# Patient Record
Sex: FEMALE | Race: ASIAN | NOT HISPANIC OR LATINO
[De-identification: names, ages, dates, MRNs, and addresses within clinical notes are randomized per-mention and may not be internally consistent; named-entity substitution may affect disease eponyms.]

---

## 2018-07-23 ENCOUNTER — APPOINTMENT (OUTPATIENT)
Dept: ORTHOPEDIC SURGERY | Facility: CLINIC | Age: 58
End: 2018-07-23
Payer: COMMERCIAL

## 2018-07-23 DIAGNOSIS — M25.552 PAIN IN LEFT HIP: ICD-10-CM

## 2018-07-23 PROBLEM — Z00.00 ENCOUNTER FOR PREVENTIVE HEALTH EXAMINATION: Status: ACTIVE | Noted: 2018-07-23

## 2018-07-23 PROCEDURE — 73502 X-RAY EXAM HIP UNI 2-3 VIEWS: CPT | Mod: LT

## 2018-07-23 PROCEDURE — 99202 OFFICE O/P NEW SF 15 MIN: CPT

## 2018-08-13 ENCOUNTER — RX RENEWAL (OUTPATIENT)
Age: 58
End: 2018-08-13

## 2018-08-13 RX ORDER — CELECOXIB 200 MG/1
200 CAPSULE ORAL DAILY
Qty: 30 | Refills: 0 | Status: ACTIVE | COMMUNITY
Start: 2018-08-13 | End: 1900-01-01

## 2018-09-10 ENCOUNTER — RX RENEWAL (OUTPATIENT)
Age: 58
End: 2018-09-10

## 2018-09-28 ENCOUNTER — OTHER (OUTPATIENT)
Age: 58
End: 2018-09-28

## 2018-10-15 ENCOUNTER — RX RENEWAL (OUTPATIENT)
Age: 58
End: 2018-10-15

## 2018-11-19 ENCOUNTER — RX RENEWAL (OUTPATIENT)
Age: 58
End: 2018-11-19

## 2018-11-19 RX ORDER — CELECOXIB 200 MG/1
200 CAPSULE ORAL DAILY
Qty: 30 | Refills: 0 | Status: ACTIVE | COMMUNITY
Start: 2018-08-13 | End: 1900-01-01

## 2018-12-01 ENCOUNTER — OTHER (OUTPATIENT)
Age: 58
End: 2018-12-01

## 2018-12-28 ENCOUNTER — APPOINTMENT (OUTPATIENT)
Dept: ORTHOPEDIC SURGERY | Facility: CLINIC | Age: 58
End: 2018-12-28
Payer: MEDICAID

## 2018-12-28 VITALS — BODY MASS INDEX: 25.69 KG/M2 | HEIGHT: 63 IN | WEIGHT: 145 LBS

## 2018-12-28 PROCEDURE — 99214 OFFICE O/P EST MOD 30 MIN: CPT

## 2019-02-07 ENCOUNTER — OUTPATIENT (OUTPATIENT)
Dept: OUTPATIENT SERVICES | Facility: HOSPITAL | Age: 59
LOS: 1 days | End: 2019-02-07
Payer: COMMERCIAL

## 2019-02-07 VITALS
HEART RATE: 66 BPM | DIASTOLIC BLOOD PRESSURE: 80 MMHG | SYSTOLIC BLOOD PRESSURE: 115 MMHG | HEIGHT: 63 IN | OXYGEN SATURATION: 96 % | WEIGHT: 145.06 LBS | TEMPERATURE: 96 F

## 2019-02-07 DIAGNOSIS — M75.111 INCOMPLETE ROTATOR CUFF TEAR OR RUPTURE OF RIGHT SHOULDER, NOT SPECIFIED AS TRAUMATIC: ICD-10-CM

## 2019-02-07 DIAGNOSIS — M67.911 UNSPECIFIED DISORDER OF SYNOVIUM AND TENDON, RIGHT SHOULDER: ICD-10-CM

## 2019-02-07 DIAGNOSIS — Z01.818 ENCOUNTER FOR OTHER PREPROCEDURAL EXAMINATION: ICD-10-CM

## 2019-02-07 LAB
HCT VFR BLD CALC: 43.2 % — SIGNIFICANT CHANGE UP (ref 34.5–45)
HGB BLD-MCNC: 14 G/DL — SIGNIFICANT CHANGE UP (ref 11.5–15.5)
MCHC RBC-ENTMCNC: 29.2 PG — SIGNIFICANT CHANGE UP (ref 27–34)
MCHC RBC-ENTMCNC: 32.4 GM/DL — SIGNIFICANT CHANGE UP (ref 32–36)
MCV RBC AUTO: 90 FL — SIGNIFICANT CHANGE UP (ref 80–100)
NRBC # BLD: 0 /100 WBCS — SIGNIFICANT CHANGE UP (ref 0–0)
PLATELET # BLD AUTO: 434 K/UL — HIGH (ref 150–400)
RBC # BLD: 4.8 M/UL — SIGNIFICANT CHANGE UP (ref 3.8–5.2)
RBC # FLD: 11.9 % — SIGNIFICANT CHANGE UP (ref 10.3–14.5)
WBC # BLD: 7.66 K/UL — SIGNIFICANT CHANGE UP (ref 3.8–10.5)
WBC # FLD AUTO: 7.66 K/UL — SIGNIFICANT CHANGE UP (ref 3.8–10.5)

## 2019-02-07 PROCEDURE — 93010 ELECTROCARDIOGRAM REPORT: CPT

## 2019-02-07 PROCEDURE — 93005 ELECTROCARDIOGRAM TRACING: CPT

## 2019-02-07 PROCEDURE — 36415 COLL VENOUS BLD VENIPUNCTURE: CPT

## 2019-02-07 PROCEDURE — 85027 COMPLETE CBC AUTOMATED: CPT

## 2019-02-07 PROCEDURE — G0463: CPT

## 2019-02-07 NOTE — H&P PST ADULT - HISTORY OF PRESENT ILLNESS
59 y/o female from home  accompanied with her daughter with PMH of osteoporosis and HDL came in PST for chief complaint  of chronic pain for more than 6 months to her right shoulder. Denied any injury/trauma to shoulder Pt stated that her pain is constant, moderate, (8/10) radiating down to her finger with worsen with activities such as lifting or doing regular home task, she gets some relief with resting and tylenol po PRN a. Pt is now here for PST for Arthroscopy because of failed pharmacological /conservative interventions

## 2019-02-07 NOTE — H&P PST ADULT - PMH
History of migraine headaches    HLD (hyperlipidemia)    Osteoporosis    Rotator cuff disorder, right

## 2019-02-26 ENCOUNTER — TRANSCRIPTION ENCOUNTER (OUTPATIENT)
Age: 59
End: 2019-02-26

## 2019-02-27 ENCOUNTER — RESULT REVIEW (OUTPATIENT)
Age: 59
End: 2019-02-27

## 2019-02-27 ENCOUNTER — APPOINTMENT (OUTPATIENT)
Dept: ORTHOPEDIC SURGERY | Facility: HOSPITAL | Age: 59
End: 2019-02-27

## 2019-02-27 RX ORDER — SIMVASTATIN 20 MG/1
1 TABLET, FILM COATED ORAL
Qty: 0 | Refills: 0 | COMMUNITY

## 2019-02-27 RX ORDER — ACETAMINOPHEN 500 MG
2 TABLET ORAL
Qty: 0 | Refills: 0 | COMMUNITY

## 2019-02-27 RX ORDER — CELECOXIB 200 MG/1
1 CAPSULE ORAL
Qty: 0 | Refills: 0 | COMMUNITY

## 2019-02-27 RX ORDER — ALENDRONATE SODIUM 70 MG/1
1 TABLET ORAL
Qty: 0 | Refills: 0 | COMMUNITY

## 2019-03-03 ENCOUNTER — EMERGENCY (EMERGENCY)
Facility: HOSPITAL | Age: 59
LOS: 1 days | Discharge: HOME | End: 2019-03-03
Attending: EMERGENCY MEDICINE | Admitting: EMERGENCY MEDICINE
Payer: COMMERCIAL

## 2019-03-03 VITALS
SYSTOLIC BLOOD PRESSURE: 139 MMHG | RESPIRATION RATE: 16 BRPM | HEIGHT: 63 IN | TEMPERATURE: 98 F | WEIGHT: 147.93 LBS | OXYGEN SATURATION: 99 % | DIASTOLIC BLOOD PRESSURE: 85 MMHG | HEART RATE: 117 BPM

## 2019-03-03 VITALS — DIASTOLIC BLOOD PRESSURE: 85 MMHG | TEMPERATURE: 97 F | HEART RATE: 120 BPM | SYSTOLIC BLOOD PRESSURE: 142 MMHG

## 2019-03-03 PROCEDURE — 99283 EMERGENCY DEPT VISIT LOW MDM: CPT

## 2019-03-03 RX ADMIN — Medication 1 ENEMA: at 12:48

## 2019-03-03 NOTE — ED PROVIDER NOTE - CLINICAL SUMMARY MEDICAL DECISION MAKING FREE TEXT BOX
pt with constipation, rectal pressure no bm in 5 days s/p shoulder surg and taking percocets - disimpact/enema

## 2019-03-03 NOTE — ED ADULT NURSE NOTE - OBJECTIVE STATEMENT
pt states she has not had a bowel movement since Wednesday. pt had shoulder surgery on Thursday, and has been taking percocet for pain. pt with complaints of rectal pain and burning.

## 2019-03-03 NOTE — ED ADULT NURSE NOTE - CHIEF COMPLAINT QUOTE
pt s/p arthroscopic right shoulder surgery this past wednesday at [facility], c/o of constipation x 5 days. on percocet

## 2019-03-03 NOTE — ED ADULT TRIAGE NOTE - CHIEF COMPLAINT QUOTE
pt s/p arthroscopic right shoulder surgery this past wednesday at [facility], c/o of constipation x 5 days. on percocet

## 2019-03-03 NOTE — ED PROVIDER NOTE - OBJECTIVE STATEMENT
pt s/p right shoulder surg 2/27 on percocet for postop pain bib daughter for constipation with no BM since surgery. + rectal pain and pressure. no fevers, chills, d/n/v, abd pain.  pmd - amin

## 2019-03-04 PROBLEM — M67.911 UNSPECIFIED DISORDER OF SYNOVIUM AND TENDON, RIGHT SHOULDER: Chronic | Status: ACTIVE | Noted: 2019-02-07

## 2019-03-04 PROBLEM — M81.0 AGE-RELATED OSTEOPOROSIS WITHOUT CURRENT PATHOLOGICAL FRACTURE: Chronic | Status: ACTIVE | Noted: 2019-02-07

## 2019-03-04 PROBLEM — Z86.69 PERSONAL HISTORY OF OTHER DISEASES OF THE NERVOUS SYSTEM AND SENSE ORGANS: Chronic | Status: ACTIVE | Noted: 2019-02-07

## 2019-03-04 PROBLEM — E78.5 HYPERLIPIDEMIA, UNSPECIFIED: Chronic | Status: ACTIVE | Noted: 2019-02-07

## 2019-03-06 PROBLEM — E78.5 HYPERLIPIDEMIA, UNSPECIFIED: Chronic | Status: ACTIVE | Noted: 2019-03-03

## 2019-03-06 PROBLEM — M81.0 AGE-RELATED OSTEOPOROSIS WITHOUT CURRENT PATHOLOGICAL FRACTURE: Chronic | Status: ACTIVE | Noted: 2019-03-03

## 2019-03-08 ENCOUNTER — APPOINTMENT (OUTPATIENT)
Dept: ORTHOPEDIC SURGERY | Facility: CLINIC | Age: 59
End: 2019-03-08
Payer: MEDICAID

## 2019-03-08 VITALS — HEIGHT: 63 IN | WEIGHT: 145 LBS | BODY MASS INDEX: 25.69 KG/M2

## 2019-03-08 PROCEDURE — 99024 POSTOP FOLLOW-UP VISIT: CPT

## 2019-03-08 RX ORDER — OXYCODONE AND ACETAMINOPHEN 5; 325 MG/1; MG/1
5-325 TABLET ORAL
Qty: 20 | Refills: 0 | Status: ACTIVE | COMMUNITY
Start: 2019-03-08 | End: 1900-01-01

## 2019-03-08 NOTE — ADDENDUM
[FreeTextEntry1] : I, Bernardo Mendez, acted solely as a scribe for Dr. Gavin Hoover on this date 03/08/2019.\par \par \par \par

## 2019-03-08 NOTE — HISTORY OF PRESENT ILLNESS
[Doing Well] : is doing well [Excellent Pain Control] : has excellent pain control [No Sign of Infection] : is showing no signs of infection [___ Weeks Post Op] : [unfilled] weeks post op [Clean/Dry/Intact] : clean, dry and intact [Erythema] : erythematous [Swelling] : swollen [Healed] : not healed [Discharge] : absent of discharge [Dehiscence] : not dehisced [de-identified] : s/p right shoulder arthroscopic RCR DOS: 2/27/2019 [de-identified] : 58 year old female  presents for a post operative evaluation s/p right shoulder arthroscopic RCR DOS: 2/27/2019. Overall she is progressing well after her procedure. She is currently using a sling today. Overall her pain is well controlled. She is taking about one Oxycontin per day recently, but was taking about 2-3 per day in the days following her surgery. She notes some pain when extending her right arm. She denies fever, chills, nausea, or vomiting  [de-identified] : Right Upper Extremity\par o Shoulder : \par ¦ Inspection/Palpation : incision is well-healing. \par o Muscle Bulk : no atrophy\par o Sensation : sensation intact to light touch\par o Skin : no skin rash or discoloration\par o Vascular Exam : no edema or cyanosis, radial and ulnar pulses normal  [de-identified] : 58 year old female s/p right shoulder arthroscopic RCR DOS: 2/27/2019\par  [de-identified] : Arthroscopy images reviewed in great detail with patient.\par Sutures were removed and Steri-Strips were placed. she  was instructed to allow the Steri-Strips to fall off on their own.\par She is to continue use of the sling.\par Activity modifications and restrictions were discussed. \par I provided a refill for Oxycontin.\par FU 4 weeks.

## 2019-03-08 NOTE — END OF VISIT
[FreeTextEntry3] : All medical record entries made by the Rachelibeddie were at my, Dr. Gavin Hoover, direction and personally dictated by me on 03/08/2019 . I have reviewed the chart and agree that the record accurately reflects my personal performance of the history, physical exam, assessment and plan. I have also personally directed, reviewed, and agreed with the chart.

## 2019-04-05 ENCOUNTER — APPOINTMENT (OUTPATIENT)
Dept: ORTHOPEDIC SURGERY | Facility: CLINIC | Age: 59
End: 2019-04-05
Payer: MEDICAID

## 2019-04-05 PROCEDURE — 99024 POSTOP FOLLOW-UP VISIT: CPT

## 2019-04-05 NOTE — END OF VISIT
[FreeTextEntry3] : All medical record entries made by the Rachelibeddie were at my, Dr. Gavin Hoover, direction and personally dictated by me on 04/05/2019. I have reviewed the chart and agree that the record accurately reflects my personal performance of the history, physical exam, assessment and plan. I have also personally directed, reviewed, and agreed with the chart.

## 2019-04-05 NOTE — HISTORY OF PRESENT ILLNESS
[___ Weeks Post Op] : [unfilled] weeks post op [de-identified] : s/p right shoulder arthroscopic RCR on 2/27/2019. [de-identified] : 58 year old female  presents for a post operative evaluation s/p right shoulder arthroscopic RCR on 2/27/2019. She is wearing a sling and says that she has been doing well and that she has been experiencing minimal pain about her shoulder. She has only had minimal discomfort with sleeping at night. She has no signs of infections and denies fever, chills, nausea, or vomiting. She has no other complaints at this time.  [de-identified] : Right Upper Extremity\par o Shoulder :\par ¦ Inspection/Palpation : improved tenderness about the shoulder, no swelling, no deformity. Arthroscopic incisions well healed, clean, dry, and intact with no discharge or dehiscence  \par ¦ Range of Motion : PASSIVE FORWARD ELEVATION: Measured at 70 degrees, ACTIVE EXTERNAL ROTATION: Measured at 20 degrees, ACTIVE INTERNAL ROTATION: Measured at  greater trochanter\par o Upper Arm : no tenderness, no swelling, no deformities\par o Muscle Bulk : no atrophy \par o Sensation : sensation intact to light touch \par o Skin : no skin rash, no discoloration \par o Vascular Exam : no edema, no cyanosis, radial and ulnar pulses normal  [de-identified] : She is to continue use of the sling for the next 1-2 weeks. \par \par A prescription for Physical Therapy was provided. \par \par Activity modifications and restrictions were discussed. \par \par FU 6 weeks.

## 2019-04-05 NOTE — ADDENDUM
[FreeTextEntry1] : I, Emmy Hall, acted solely as a scribe for Dr. Gavin Hoover on this date 04/05/2019.

## 2019-05-10 ENCOUNTER — APPOINTMENT (OUTPATIENT)
Dept: ORTHOPEDIC SURGERY | Facility: CLINIC | Age: 59
End: 2019-05-10
Payer: MEDICAID

## 2019-05-10 VITALS — BODY MASS INDEX: 25.69 KG/M2 | WEIGHT: 145 LBS | HEIGHT: 63 IN

## 2019-05-10 PROCEDURE — 99024 POSTOP FOLLOW-UP VISIT: CPT

## 2019-05-10 NOTE — END OF VISIT
[FreeTextEntry3] : All medical record entries made by the Rachelibe were at my, Dr. Gavin Hoover, direction and personally dictated by me on 05/10/2019. I have reviewed the chart and agree that the record accurately reflects my personal performance of the history, physical exam, assessment and plan. I have also personally directed, reviewed, and agreed with the chart.

## 2019-05-10 NOTE — HISTORY OF PRESENT ILLNESS
[___ Weeks Post Op] : [unfilled] weeks post op [de-identified] : Right Upper Extremity\par o Shoulder :\par ¦ Inspection/Palpation : tenderness about the shoulder, no swelling, no deformity. Arthroscopic incisions well healed, clean, dry, and intact with no discharge or dehiscence \par ¦ Range of Motion :  PASSIVE FORWARD ELEVATION: Measured at 95 degrees, ACTIVE FORWARD ELEVATION: Measured at 60 degrees, ACTIVE EXTERNAL ROTATION: Measured at 15 degrees, ACTIVE INTERNAL ROTATION: Measured at L5 \par ¦ Strength : external rotation 3/5, internal rotation 5/5, supraspinatus 3/5 \par ¦ Stability : no joint instability on provocative testing\par o Upper Arm : no tenderness, no swelling, no deformities\par o Muscle Bulk : no atrophy \par o Sensation : sensation intact to light touch \par o Skin : no skin rash, no discoloration \par o Vascular Exam : no edema, no cyanosis, radial and ulnar pulses normal  [de-identified] : 58 year old female  presents for a post operative evaluation s/p right shoulder arthroscopic RCR on 2/27/2019. The patient is accompanied by her daughter who is contributing to her medical history. She has been doing well and says that she has been experiencing minimal pain about her shoulder. She has been attending physical therapy and notes pain about her shoulder after the sessions. She has no signs of infections and denies fever, chills, nausea, or vomiting. She has no other complaints at this time.  [de-identified] : s/p right shoulder arthroscopic RCR on 2/27/2019. [de-identified] : She is to continue with physical therapy and a home exercise program to increase her strength and ROM, a prescription for physical therapy was provided.\par \par FU 4-6 weeks.

## 2019-05-10 NOTE — ADDENDUM
[FreeTextEntry1] : I, Emmy Hall, acted solely as a scribe for Dr. Gavin Hoover on this date 05/10/2019.

## 2019-06-14 ENCOUNTER — APPOINTMENT (OUTPATIENT)
Dept: ORTHOPEDIC SURGERY | Facility: CLINIC | Age: 59
End: 2019-06-14
Payer: MEDICAID

## 2019-06-14 VITALS — WEIGHT: 145 LBS | BODY MASS INDEX: 25.69 KG/M2 | HEIGHT: 63 IN

## 2019-06-14 DIAGNOSIS — M75.121 COMPLETE ROTATOR CUFF TEAR OR RUPTURE OF RIGHT SHOULDER, NOT SPECIFIED AS TRAUMATIC: ICD-10-CM

## 2019-06-14 PROCEDURE — 99213 OFFICE O/P EST LOW 20 MIN: CPT

## 2019-06-14 NOTE — ADDENDUM
[FreeTextEntry1] : I, Emmy Hall, acted solely as a scribe for Dr. Gavin Hoover on this date 06/14/2019.

## 2019-06-14 NOTE — DISCUSSION/SUMMARY
[de-identified] : The underlying pathophysiology was reviewed in great detail with the patient as well as the various treatment options, including ice, analgesics, NSAIDs, Physical therapy, steroid injections.\par \par She is to continue with physical therapy, a prescription was provided.\par \par She may begin strengthening \par \par FU 8 weeks.

## 2019-06-14 NOTE — HISTORY OF PRESENT ILLNESS
[de-identified] : 58 year old female presents for an evaluation of right shoulder pain, s/p right shoulder arthroscopic rotator cuff repair on 2/27/2019. The patient is accompanied by her daughter who is contributing to her medical history. She has been doing well since her last visit and states that she has not been experiencing pain about her shoulder and that she is able to sleep through the night. She has been attending physical therapy and performing home exercises, though she has continued to experience weakness about her shoulder. She has no other complaints at this time.

## 2019-06-14 NOTE — PHYSICAL EXAM
[Normal LUE] : Left Upper Extremity: No scars, rashes, lesions, ulcers, skin intact [Normal Touch] : sensation intact for touch [Normal] : No swelling, no edema, normal pedal pulses and normal temperature [Poor Appearance] : well-appearing [Acute Distress] : not in acute distress [Obese] : not obese [de-identified] : Right Upper Extremity\par o Shoulder :\par ¦ Inspection/Palpation : no tenderness, no swelling, no deformity, arthroscopic scars well appearing \par ¦ Range of Motion :  PASSIVE FORWARD ELEVATION: Measured at 110 degrees, ACTIVE FORWARD ELEVATION: Measured at 75 degrees, ACTIVE EXTERNAL ROTATION: Measured at 40 degrees, ACTIVE INTERNAL ROTATION: Measured at L1\par ¦ Strength : external rotation 4/5, internal rotation 5/5, supraspinatus 3+/5 \par ¦ Stability : no joint instability on provocative testing\par ¦ Tests/Signs : Neer (-), Alvarenga (-)\par o Upper Arm : no tenderness, no swelling, no deformities\par o Muscle Bulk : no atrophy \par o Sensation : sensation intact to light touch \par o Skin : no skin rash, no discoloration \par o Vascular Exam : no edema, no cyanosis, radial and ulnar pulses normal

## 2019-06-14 NOTE — END OF VISIT
[FreeTextEntry3] : All medical record entries made by the Rachelibe were at my, Dr. Gavin Hoover, direction and personally dictated by me on 06/14/2019. I have reviewed the chart and agree that the record accurately reflects my personal performance of the history, physical exam, assessment and plan. I have also personally directed, reviewed, and agreed with the chart.

## 2019-08-09 ENCOUNTER — APPOINTMENT (OUTPATIENT)
Dept: ORTHOPEDIC SURGERY | Facility: CLINIC | Age: 59
End: 2019-08-09
Payer: MEDICAID

## 2019-08-09 DIAGNOSIS — Z98.890 OTHER SPECIFIED POSTPROCEDURAL STATES: ICD-10-CM

## 2019-08-09 PROCEDURE — 99213 OFFICE O/P EST LOW 20 MIN: CPT

## 2019-08-09 NOTE — DISCUSSION/SUMMARY
[de-identified] : The underlying pathophysiology was reviewed in great detail with the patient as well as the various treatment options, including ice, analgesics, NSAIDs, Physical therapy, steroid injections.\par \par A prescription for Physical Therapy was provided, a prescription was provided.\par \par An MRI of the right shoulder will be ordered if her strength and ROM does not improve.\par \par FU 4-6 weeks.

## 2019-08-09 NOTE — HISTORY OF PRESENT ILLNESS
[de-identified] : 58 year old female presents for an evaluation of right shoulder pain, s/p right shoulder arthroscopic rotator cuff repair on 2/27/2019. The patient is accompanied to the office by her daughter who is contributing to her medical history. She has been attending physical therapy as well as performing an at home exercise program and notes improvements in her strength and ROM, though she reports she is still having weakness and limitation in her shoulder mobility. The patient notes that she has been experiencing no pain about her right shoulder. She has no other complaints at this time.

## 2019-08-09 NOTE — END OF VISIT
[FreeTextEntry3] : All medical record entries made by the Rachelibeddie were at my, Dr. Gavin Hoover, direction and personally dictated by me on 08/09/2019. I have reviewed the chart and agree that the record accurately reflects my personal performance of the history, physical exam, assessment and plan. I have also personally directed, reviewed, and agreed with the chart.

## 2019-08-09 NOTE — ADDENDUM
[FreeTextEntry1] : I, Savanna Carpenter, acted solely as a scribe for Dr. Gavin Hoover on this date 08/09/2019.

## 2019-08-09 NOTE — PHYSICAL EXAM
[Normal LUE] : Left Upper Extremity: No scars, rashes, lesions, ulcers, skin intact [Normal Touch] : sensation intact for touch [Normal] : No swelling, no edema, normal pedal pulses and normal temperature [Poor Appearance] : well-appearing [Acute Distress] : not in acute distress [Obese] : not obese [de-identified] : Right Upper Extremity\par o Shoulder :\par ¦ Inspection/Palpation : no tenderness, no swelling, no deformity, arthroscopic scars well appearing \par ¦ Range of Motion :  PASSIVE FORWARD ELEVATION: Measured at 130 degrees, ACTIVE FORWARD ELEVATION: Measured at 100 degrees, ACTIVE EXTERNAL ROTATION: Measured at 30 degrees, ACTIVE INTERNAL ROTATION: Measured at T10\par ¦ Strength : external rotation 4-/5, internal rotation 5/5, supraspinatus 3+/5 \par ¦ Stability : no joint instability on provocative testing\par o Upper Arm : no tenderness, no swelling, no deformities\par o Muscle Bulk : no atrophy \par o Sensation : sensation intact to light touch \par o Skin : no skin rash, no discoloration \par o Vascular Exam : no edema, no cyanosis, radial and ulnar pulses normal

## 2019-10-11 ENCOUNTER — APPOINTMENT (OUTPATIENT)
Dept: ORTHOPEDIC SURGERY | Facility: CLINIC | Age: 59
End: 2019-10-11

## 2020-01-26 NOTE — ED ADULT TRIAGE NOTE - MODE OF ARRIVAL
OB triage  IV hydration  IV Phenergan  Continue to monitor  Patient has not vomited or had episode of diarrhea since admission  States feeling better  Has RX's for Zofran and Phenergan at home  D/c to home, patient instructed to call for ride  
Walk in

## 2021-03-30 ENCOUNTER — TRANSCRIPTION ENCOUNTER (OUTPATIENT)
Age: 61
End: 2021-03-30

## 2021-11-26 ENCOUNTER — APPOINTMENT (OUTPATIENT)
Dept: ORTHOPEDIC SURGERY | Facility: CLINIC | Age: 61
End: 2021-11-26
Payer: MEDICAID

## 2021-11-26 ENCOUNTER — NON-APPOINTMENT (OUTPATIENT)
Age: 61
End: 2021-11-26

## 2021-11-26 PROCEDURE — 99214 OFFICE O/P EST MOD 30 MIN: CPT

## 2021-11-26 PROCEDURE — 73562 X-RAY EXAM OF KNEE 3: CPT | Mod: 26,50

## 2021-11-26 RX ORDER — SIMVASTATIN 20 MG/1
20 TABLET, FILM COATED ORAL
Qty: 30 | Refills: 0 | Status: ACTIVE | COMMUNITY
Start: 2021-05-07

## 2021-11-26 RX ORDER — DICLOFENAC SODIUM 16.05 MG/ML
1.5 SOLUTION TOPICAL
Qty: 1 | Refills: 3 | Status: ACTIVE | COMMUNITY
Start: 2021-11-26 | End: 1900-01-01

## 2021-11-26 NOTE — PHYSICAL EXAM
[de-identified] : Right knee Physical Examination:\par \par General: Alert and oriented x3.  In no acute distress.  Pleasant in nature with a normal affect.  No apparent respiratory distress. \par \par Erythema, Warmth, Rubor: Negative\par Swelling/Edema: Very mild swelling noted to left knee.\par ROM: 0-120 degrees\par Shahriar's Test: Negative \par Medial Joint Line TTP: Negative\par Lateral Joint Line TTP: Positive\par Lachman Exam/Anterior Drawer/Pivot Shift Test: Negative \par MCL Pain: Negative\par LCL Pain: Negative\par Iliotibial Band Pain: Negative\par Patellofemoral Joint Pain: Positive, with crepitus noted with range of motion of bilateral knees.\par Patellar Tendon TTP: Negative\par Pes Anserinus TTP: Negative\par Homans Sign: Negative\par Posterior Knee Pain: Negative\par Neuro: Intact with no sensory or motor deficits\par \par Left knee Physical Examination:\par \par General: Alert and oriented x3.  In no acute distress.  Pleasant in nature with a normal affect.  No apparent respiratory distress. \par \par Erythema, Warmth, Rubor: Negative\par Swelling/Edema: Edema of the suprapatellar bursa\par ROM: 0-120 degrees\par Shahriar's Test: Negative \par Medial Joint Line TTP: Negative\par Lateral Joint Line TTP: Positive\par Lachman Exam/Anterior Drawer/Pivot Shift Test: Negative \par MCL Pain: Negative\par LCL Pain: Negative\par Iliotibial Band Pain: Negative\par Patellofemoral Joint Pain: Positive\par Patellar Tendon TTP: Negative\par Pes Anserinus TTP: Negative\par Homans Sign: Negative\par Posterior Knee Pain: Negative\par Neuro: Intact with no sensory or motor deficits [de-identified] : X-rays taken of the bilateral knees in office, reviewed on 11/26/2021 revealed: Mild osteoarthritic changes bilateral patellofemoral joints.

## 2021-11-26 NOTE — HISTORY OF PRESENT ILLNESS
[de-identified] : 61-year-old female presents the office for bilateral knee pain.  She notes that her pain has been ongoing for many years but has been increasingly getting worse over the last few months.  She cannot cite her current pain to any recent injury or trauma and notes no past injuries/trauma.  Her pain is worsened with walking, getting up from a seated position, and climbing stairs.  Her pain is located along her kneecaps as well as the lateral aspects of both her knees.  She denies locking and catching of her knees.  She has tried Tylenol and NSAIDs for her pain noting some relief.  She has no other complaints at this time

## 2021-11-26 NOTE — DISCUSSION/SUMMARY
[de-identified] : Assessment: Bilateral knee pain/PF-J Osteoarthritis.\par \par Plan:\par #1. NSAIDs/Tylenol as needed, as tolerated by patient.  Use as directed. \par #2. Ice and Heat Therapy. \par #3. Physical Therapy RX given. \par #4. OTC knee brace as needed. \par #5.  We discussed cortisone injection versus hyaluronic acid injections for her knees.  At this point in time the patient would like to hold off on any invasive treatments.  She will continue with conservative management above.  If in the near future her knees worsen, she can return to office for cortisone injection bilateral knees and consider trying to get hyaluronic acid injections approved by her insurance.  She can follow-up as needed.

## 2021-12-29 RX ORDER — AZITHROMYCIN 250 MG/1
250 TABLET, FILM COATED ORAL
Qty: 1 | Refills: 1 | Status: ACTIVE | COMMUNITY
Start: 2021-12-29 | End: 1900-01-01

## 2022-03-02 ENCOUNTER — RX RENEWAL (OUTPATIENT)
Age: 62
End: 2022-03-02

## 2022-03-02 RX ORDER — MELOXICAM 15 MG/1
15 TABLET ORAL DAILY
Qty: 30 | Refills: 2 | Status: ACTIVE | COMMUNITY
Start: 2021-11-26 | End: 1900-01-01

## 2022-03-23 ENCOUNTER — APPOINTMENT (OUTPATIENT)
Dept: ORTHOPEDIC SURGERY | Facility: CLINIC | Age: 62
End: 2022-03-23
Payer: MEDICAID

## 2022-03-23 DIAGNOSIS — M75.82 OTHER SHOULDER LESIONS, LEFT SHOULDER: ICD-10-CM

## 2022-03-23 DIAGNOSIS — M89.8X1 OTHER SPECIFIED DISORDERS OF BONE, SHOULDER: ICD-10-CM

## 2022-03-23 DIAGNOSIS — M25.512 PAIN IN LEFT SHOULDER: ICD-10-CM

## 2022-03-23 PROCEDURE — 73030 X-RAY EXAM OF SHOULDER: CPT | Mod: LT

## 2022-03-23 PROCEDURE — 99213 OFFICE O/P EST LOW 20 MIN: CPT

## 2022-03-23 NOTE — PHYSICAL EXAM
[de-identified] : Left shoulder exam\par Inspection: No malalignment, No defects\par Skin: No masses, No lesions\par Neck: Negative Spurlings, full ROM without pain\par ROM: Full active range of motion of the left shoulder, pain elicited at the extremes of motion in flexion abduction.\par Tenderness: No bicipital tenderness, no tenderness to the greater tuberosity/RTC insertion, no anterior shoulder/lesser tuberosity tenderness\par Strength: 5/5 ER, 5/5 IR in adduction, 5/5 supraspinatus testing, negative O'Briens test\par AC Joint: No ttp, no pain with cross arm testing\par Biceps: Speed negative\par Impingement test: Mild positive Alvarenga\par Stability: Negative apprehension, negative anterior/posterior load and shift\par Vasc: 2+ radial pulse\par Neuro: AIN, PIN, Ulnar nerve in tact to motor\par Sensation: In tact to light touch throughout\par  [de-identified] : The following radiographs were ordered and read by me during this patients visit. I reviewed each radiograph in detail with the patient and discussed the findings as highlighted below. \par 3 x-ray views of the left shoulder reveal no acute fracture or osseous abnormality, no evidence of dislocation.

## 2022-03-23 NOTE — HISTORY OF PRESENT ILLNESS
[FreeTextEntry1] : 61 year female presents for evaluation of left shoulder pain present for about 2 months.  She denies acute injury or inciting event.  She localizes the pain to the superior lateral aspect of the shoulder with occasional radiation of her pain down to the elbow.  She reports pain is intermittent and activity related.  She denies associated weakness.  She reports intermittent use of anti-inflammatory with relief of her symptoms.  She reports a history of right shoulder rotator cuff repair.  She denies left upper extremity paresthesias or neck pain.

## 2022-03-23 NOTE — ASSESSMENT
[FreeTextEntry1] : 61-year-old female with 2-month history of left shoulder pain, exam findings consistent with rotator cuff tendinitis and periscapular spasm.\par Patient has been recommended for conservative treatment with a course of physical therapy.  Prescription for PT has been provided for the patient today.  She will continue with home exercises well to maintain mobility.  She has been recommended for over-the-counter use of anti-inflammatory/Tylenol as needed for pain control.  She will follow up in 4 to 6 weeks for reevaluation.  If symptoms persist despite the above treatment, MRI may be warranted at that time. All questions and concerns have been addressed, and the patient is in agreement with the above plan.

## 2022-04-25 ENCOUNTER — TRANSCRIPTION ENCOUNTER (OUTPATIENT)
Age: 62
End: 2022-04-25

## 2022-06-01 ENCOUNTER — APPOINTMENT (OUTPATIENT)
Dept: ORTHOPEDIC SURGERY | Facility: CLINIC | Age: 62
End: 2022-06-01
Payer: MEDICAID

## 2022-06-01 VITALS
BODY MASS INDEX: 24.8 KG/M2 | HEART RATE: 67 BPM | SYSTOLIC BLOOD PRESSURE: 130 MMHG | DIASTOLIC BLOOD PRESSURE: 84 MMHG | WEIGHT: 140 LBS | HEIGHT: 63 IN

## 2022-06-01 PROCEDURE — 28510 TREATMENT OF TOE FRACTURE: CPT | Mod: T1

## 2022-06-01 PROCEDURE — 99214 OFFICE O/P EST MOD 30 MIN: CPT | Mod: 25,57

## 2022-06-01 PROCEDURE — 73630 X-RAY EXAM OF FOOT: CPT | Mod: LT

## 2022-06-01 PROCEDURE — 20610 DRAIN/INJ JOINT/BURSA W/O US: CPT | Mod: LT

## 2022-06-01 NOTE — ADDENDUM
[FreeTextEntry1] : I, Radha Ch, acted solely as a scribe for Dr. Anoop Foss on this date 06/01/2022.\par \par All medical record entries made by the Scribe were at my, Dr. Anoop Foss, direction and personally dictated by me on 06/01/2022 . I have reviewed the chart and agree that the record accurately reflects my personal performance of the history, physical exam, assessment and plan. I have also personally directed, reviewed, and agreed with the chart.	\par

## 2022-06-01 NOTE — HISTORY OF PRESENT ILLNESS
[FreeTextEntry1] : The patient is a 61 year old female presenting with her daughter in law for an evaluation of left foot injury sustained 2 weeks prior. Her daughter in law reports that the patient injured her foot in a collision injury where she banged the foot on the side of her furniture while playing with her grandchildren. Since the injury, the patient has utilized ice, diclofenac, and rest with improvement in pain and swelling. She presents today for further evaluation of the same. Pain is localized over the second toe. The patient does report left knee pain that has worsened since her last evaluation. She has previously utilized Meloxicam for this issue with improvement. For pain, she does take OTC Advil. Her pain scale is 3-4/10 with Advil. Without, her pain scale is described to be a 8/10. Pain is exacerbated with stair utilization. She is wearing sandals. No other complaints. \par \par \par \par .

## 2022-06-01 NOTE — PHYSICAL EXAM
[de-identified] : General: Alert and oriented x3. In no acute distress. Pleasant in nature with a normal affect. No apparent respiratory distress.\par \par Left Foot Exam\par Skin: Clean, dry, intact\par Inspection: No obvious malalignment, no masses, no swelling, no effusion\par Pulses: 2+ DP/PT pulses\par ROM: FOOT Full  ROM of digits, ANKLE 10 degrees of dorsiflexion, 40 degrees of plantarflexion, 10 degrees of subtalar motion.\par Painful ROM: None\par Tenderness: + Pain over the proximal phalanx fracture of the second toe. No tenderness over the medial malleolus, No tenderness over the lateral malleolus, no CFL/ATFL/PTFL pain, no deltoid ligament pain. No heel pain. No Achilles tenderness. No 5th metatarsal pain. No pain to the LisFranc joint. No ttp over the posterior tibial tendon.\par Stability: Negative anterior/posterior drawer.\par Strength: 5/5 ADD/ABD/TA/GS/EHL/FHL/EDL\par Neuro: Sensation in tact to light touch throughout\par Additional tests: Negative Mortons test, negative tarsal tunnel tinels, negative single heel rise.	\par \par Left knee Physical Examination:\par \par General: Alert and oriented x3. In no acute distress. Pleasant in nature with a normal affect. No apparent respiratory distress. \par \par Erythema, Warmth, Rubor: Negative\par Swelling/Edema: Edema of the suprapatellar bursa\par ROM: 0-120 degrees\par Shahriar's Test: Negative \par Medial Joint Line TTP: Negative\par Lateral Joint Line TTP: Positive\par Lachman Exam/Anterior Drawer/Pivot Shift Test: Negative \par MCL Pain: Negative\par LCL Pain: Negative\par Iliotibial Band Pain: Negative\par Patellofemoral Joint Pain: Positive\par Patellar Tendon TTP: Negative\par Pes Anserinus TTP: Negative\par Homans Sign: Negative\par Posterior Knee Pain: Negative\par Neuro: Intact with no sensory or motor deficits  [de-identified] : 3V of the left foot were ordered, obtained, and reviewed by me today, 06/01/2022, revealed: Nondisplaced proximal phalanx fracture of the second toe. \par \par X-rays taken of the bilateral knees reviewed on 06/01/2022 revealed: Mild osteoarthritic changes bilateral patellofemoral joints.

## 2022-06-01 NOTE — DISCUSSION/SUMMARY
[de-identified] : Today I had a lengthy discussion with the patient regarding their left foot injury. I have addressed all the patient's concerns surrounding the pathology of their condition. XR imaging was completed in office today and results were reviewed with the patient. In order to provide the patient with a better understanding of their ailment, I educated them about the anatomy, physiology and lifespan of their condition using a foot model. I recommend that the patient utilize a stiff shoe for the next two weeks. The patient was provided with the stiff shoe in the office today. I did recommend that the patient utilize supplemental Vitamin D for bone health, with recommendation to utilize 2,000-4,000 units daily for the next month. \par \par As for her left knee, A discussion was had about a possible cortisone injection for the left knee. The patient would like to move forward with the procedure. The injection was administered in the office today. The patient tolerated the procedure well with no resistance. The patient understood and verbally agreed to the treatment plan. All of their questions were answered and they were satisfied with the visit. The patient should call the office if they have any questions or experience worsening symptoms. I would like to see the patient back in the office in PRN to reassess their condition. 				\par

## 2022-06-01 NOTE — PROCEDURE
[FreeTextEntry1] : Laterality: Left Knee\par \par The risks and benefits of the injection were reviewed/discussed with the patient today in office and all of their questions were answered.  The injection site was the anterolateral aspect of the knee with the patient sitting up and the knees flexed to 90 degrees.  Prior to the injection, the injection site was prepped with chloroprep and a sterile field was created.  Sterile technique was carried out throughout the procedure.  After verbal consent from the patient we went ahead and injected the left knee today with 1 ml 40 mg Depo-Medrol, 5 ml 1% lidocaine and 4 ml of .50% Bupivacaine for a total of 10 ml with a 25 lynn 1.5" needle.  The medication was placed into the knee without complication.  Post injection the patient reported no pain, had a normal gait and good motion of the knee.  The patient denied numbness and tingling going down their leg.  There were no complications during the procedure.

## 2022-07-13 ENCOUNTER — APPOINTMENT (OUTPATIENT)
Dept: ORTHOPEDIC SURGERY | Facility: CLINIC | Age: 62
End: 2022-07-13

## 2022-07-13 DIAGNOSIS — S92.912A UNSPECIFIED FRACTURE OF LEFT TOE(S), INITIAL ENCOUNTER FOR CLOSED FRACTURE: ICD-10-CM

## 2022-07-13 PROCEDURE — 73660 X-RAY EXAM OF TOE(S): CPT | Mod: 26,LT

## 2022-07-13 PROCEDURE — 99024 POSTOP FOLLOW-UP VISIT: CPT

## 2022-07-13 PROCEDURE — 20610 DRAIN/INJ JOINT/BURSA W/O US: CPT | Mod: 79,LT

## 2022-07-20 NOTE — PHYSICAL EXAM
[de-identified] : General: Alert and oriented x3. In no acute distress. Pleasant in nature with a normal affect. No apparent respiratory distress.\par \par Left Foot Exam\par Skin: Clean, dry, intact\par Inspection: No obvious malalignment, no masses, no swelling, no effusion\par Pulses: 2+ DP/PT pulses\par ROM: FOOT Full  ROM of digits, ANKLE 10 degrees of dorsiflexion, 40 degrees of plantarflexion, 10 degrees of subtalar motion.\par Painful ROM: None\par Tenderness: No pain over the proximal phalanx fracture of the second toe. No tenderness over the medial malleolus, No tenderness over the lateral malleolus, no CFL/ATFL/PTFL pain, no deltoid ligament pain. No heel pain. No Achilles tenderness. No 5th metatarsal pain. No pain to the LisFranc joint. No ttp over the posterior tibial tendon.\par Stability: Negative anterior/posterior drawer.\par Strength: 5/5 ADD/ABD/TA/GS/EHL/FHL/EDL\par Neuro: Sensation in tact to light touch throughout\par Additional tests: Negative Mortons test, negative tarsal tunnel tinels, negative single heel rise.	\par \par Left knee Physical Examination:\par \par General: Alert and oriented x3. In no acute distress. Pleasant in nature with a normal affect. No apparent respiratory distress. \par \par Erythema, Warmth, Rubor: Negative\par Swelling/Edema: Edema of the suprapatellar bursa\par ROM: 0-120 degrees\par Shahriar's Test: Negative \par Medial Joint Line TTP: Negative\par Lateral Joint Line TTP: Positive\par Lachman Exam/Anterior Drawer/Pivot Shift Test: Negative \par MCL Pain: Negative\par LCL Pain: Negative\par Iliotibial Band Pain: Negative\par Patellofemoral Joint Pain: Positive\par Patellar Tendon TTP: Negative\par Pes Anserinus TTP: Negative\par Homans Sign: Negative\par Posterior Knee Pain: Negative\par Neuro: Intact with no sensory or motor deficits  [de-identified] : 3V of the left foot were ordered, obtained, and reviewed, 07/14/2022, revealed: Nondisplaced proximal phalanx fracture of the second toe. \par \par X-rays taken of the bilateral knees reviewed on 06/01/2022 revealed: Mild osteoarthritic changes bilateral patellofemoral joints.\par \par X-rays of the left toe reviewed on 7/13/2022: Nondisplaced proximal phalanx fracture of the second toe.

## 2022-07-20 NOTE — HISTORY OF PRESENT ILLNESS
[FreeTextEntry1] : 7/13/22: The patient is here for a follow-up of her left second toe proximal phalanx fracture.  Patient presents wearing sandals, walking without assistance, feeling much better, no pain today in the office.  Here for an x-ray to make sure the fracture is healing correctly.  No other complaints.\par \par 6/1/22: The patient is a 61 year old female presenting with her daughter in law for an evaluation of left foot injury sustained 2 weeks prior. Her daughter in law reports that the patient injured her foot in a collision injury where she banged the foot on the side of her furniture while playing with her grandchildren. Since the injury, the patient has utilized ice, diclofenac, and rest with improvement in pain and swelling. She presents today for further evaluation of the same. Pain is localized over the second toe. The patient does report left knee pain that has worsened since her last evaluation. She has previously utilized Meloxicam for this issue with improvement. For pain, she does take OTC Advil. Her pain scale is 3-4/10 with Advil. Without, her pain scale is described to be a 8/10. Pain is exacerbated with stair utilization. She is wearing sandals. No other complaints. \par \par \par \par .

## 2022-07-20 NOTE — DISCUSSION/SUMMARY
[de-identified] : The patient has minimal to no pain in the second toe proximal phalanx.  Reviewed x-rays with her, fracture just about healed.  She can return to normal activities as tolerated.  She can follow-up as needed.

## 2022-09-09 ENCOUNTER — APPOINTMENT (OUTPATIENT)
Dept: OTOLARYNGOLOGY | Facility: CLINIC | Age: 62
End: 2022-09-09

## 2022-09-09 ENCOUNTER — APPOINTMENT (OUTPATIENT)
Dept: ORTHOPEDIC SURGERY | Facility: CLINIC | Age: 62
End: 2022-09-09

## 2022-09-09 VITALS
TEMPERATURE: 97.2 F | DIASTOLIC BLOOD PRESSURE: 84 MMHG | HEIGHT: 63 IN | WEIGHT: 140 LBS | BODY MASS INDEX: 24.8 KG/M2 | SYSTOLIC BLOOD PRESSURE: 130 MMHG

## 2022-09-09 DIAGNOSIS — J02.9 ACUTE PHARYNGITIS, UNSPECIFIED: ICD-10-CM

## 2022-09-09 DIAGNOSIS — K21.9 GASTRO-ESOPHAGEAL REFLUX DISEASE W/OUT ESOPHAGITIS: ICD-10-CM

## 2022-09-09 DIAGNOSIS — I88.9 NONSPECIFIC LYMPHADENITIS, UNSPECIFIED: ICD-10-CM

## 2022-09-09 PROCEDURE — 99204 OFFICE O/P NEW MOD 45 MIN: CPT | Mod: 25

## 2022-09-09 PROCEDURE — 99213 OFFICE O/P EST LOW 20 MIN: CPT | Mod: 25

## 2022-09-09 PROCEDURE — 20610 DRAIN/INJ JOINT/BURSA W/O US: CPT | Mod: RT

## 2022-09-09 PROCEDURE — 31575 DIAGNOSTIC LARYNGOSCOPY: CPT

## 2022-09-09 RX ORDER — AMOXICILLIN AND CLAVULANATE POTASSIUM 875; 125 MG/1; MG/1
875-125 TABLET, COATED ORAL
Qty: 20 | Refills: 0 | Status: ACTIVE | COMMUNITY
Start: 2022-09-09 | End: 1900-01-01

## 2022-09-09 NOTE — END OF VISIT
[FreeTextEntry3] : I personally saw and examined LYNN DESIR in detail.  I spoke to ALEJANDRO Alex regarding the assessment and plan of care. I performed the procedures and relevant physical exam.  I have reviewed the above assessment and plan of care and I agree.  I have made changes to the body of the note wherever necessary and appropriate.\par

## 2022-09-09 NOTE — ASSESSMENT
[FreeTextEntry1] : Ms. DESIR is a 62 year female here with family to translate pt c/o left throat pain x 1 week getting worse over last 2 days. On exam she has 1+ tonsils bilaterally with mild erythema and scope shows evidence of reflux. No nodes no masses appreciated. \par \par Lymphadenitis\par - Rx for Augmentin 875 BID x 10 days\par \par LPRD\par - Pepcid complete as needed\par - reflux precautions discussed and handout given

## 2022-09-09 NOTE — DISCUSSION/SUMMARY
[de-identified] : Assessment: Right knee Osteoarthritis/Pain\par \par Plan:\par 1. RICE Therapy.\par 2. Antiinflammatories/Tylenol as needed for pain of discomfort. \par 3. The patient was advised to rest the knee for 24-48 hours post injection.  The patient is able to resume normal activities in 24-48 hours post injection if the patient is experiencing minimal to no pain. \par 4. Continue with a home exercise/stretching program. \par 5. All the patients questions were answered.  If the patient is experiencing any problems or has concerns they were advised to call the office or make an appointment to come in to be evaluated.\par \par Follow-up as needed.\par

## 2022-09-09 NOTE — CONSULT LETTER
[FreeTextEntry1] : Dear Dr. LUDA MATTHEWS \par I had the pleasure of evaluating your patient LYNN DESIR, thank you for allowing us to participate in their care. please see full note detailing our visit below.\par If you have any questions, please do not hesitate to call me and I would be happy to discuss further. \par \par Frankie Valero M.D.\par Attending Physician,  \par Department of Otolaryngology - Head and Neck Surgery\par [facility] \par Office: [phone]\par Fax: [phone]\par \par

## 2022-09-09 NOTE — PHYSICAL EXAM
[de-identified] : Right knee Physical Examination:\par \par General: Alert and oriented x3.  In no acute distress.  Pleasant in nature with a normal affect.  No apparent respiratory distress. \par \par Erythema, Warmth, Rubor: Negative\par Swelling/Edema: Negative\par ROM: 0-130 degrees\par Shahriar's Test: Negative \par Medial Joint Line TTP: Positive\par Lateral Joint Line TTP: Negative \par Lachman Exam/Anterior Drawer/Pivot Shift Test: Negative \par MCL Pain: Negative\par LCL Pain: Negative\par Iliotibial Band Pain: Negative\par Patellofemoral Joint Pain: Positive with crepitus noted.\par Patellar Tendon TTP: Negative\par Pes Anserinus TTP: Negative\par Homans Sign: Negative\par Posterior Knee Pain: Negative\par Neuro: Intact with no sensory or motor deficits [de-identified] : No imaging performed today.

## 2022-09-09 NOTE — HISTORY OF PRESENT ILLNESS
[de-identified] : The patient is a 62-year-old female who presents with right knee pain.  She was diagnosed with osteoarthritis specifically in the patellofemoral joints and she is feeling pain deep within the knee.  The patient is seeking a cortisone injection for pain relief.  She had a cortisone injection left knee which gave her relief.  No other complaints.

## 2022-09-09 NOTE — PROCEDURE
[de-identified] : Laterality: Right knee\par \par The risks and benefits of the injection were reviewed with the patient today in office and all their questions were answered.  The injection site was the anterolateral aspect of the knee with the patient sitting up, knees flexed to 90 degrees.  Prior to giving the injection the injection site was prepped with Chloraprep and a sterile field was created.  Sterile technique was carried out throughout the procedure.  After verbal consent from the patient we went ahead and injected the right knee today with 2 ml 40 mg Depo-Medrol, 4 ml 1% lidocaine and 4 ml of .50% Bupivacaine for a total of 10 ml with a 22 lynn 1.5" needle.  The medication was placed into the knee without complication.  Post injection the patient reported no pain, had a normal gait and good motion of the knee.  The patient denied numbness and tingling going down their leg.  There were no complications during the procedure.

## 2022-09-09 NOTE — PHYSICAL EXAM
[Midline] : trachea located in midline position [Normal] : no rashes [de-identified] : 1+ bilaterally mild erythema

## 2022-09-09 NOTE — HISTORY OF PRESENT ILLNESS
[de-identified] : Ms. DESIR is a 62 year female here with family to translate pt c/o left throat pain x 1 week getting worse over last 2 days. this is her first episode\par denies cough or fever, + dyphagia, denies dyspnea or dysphonia\par denies otalgia, otorrhea, tinnitus, vertigo, hearing loss.\par denies sinus pressure, rhinorrhea, nasal obstruction\par denies tobacco history\par

## 2022-09-09 NOTE — PROCEDURE
[de-identified] : Procedure performed: laryngeal Endoscopy- Diagnostic\par Pre-op/post op indication: dysphagia\par Verbal and/or written consent obtained from patient, Patient was unable to cooperate with mirror\par Scope #: 3, flexible fiber optic telescope used \par Scope was introduced through the nose passed on the floor of the nose to the nasopharynx and then followed down the soft palate to the lower pharynx. The tongue Base, Larynx, Hypopharynx were examined. Base of tongue was symmetric, vallecular was clear, epiglottis was not deformed, subglottis/ pyriform and posterior pharyngeal walls were clear. No erythema, edema, pooling of secretions, masses or lesions. Airway patent, no foreign body visualized. Postcricoid area with moderate erythema and mild edema. + intra-artenoid bar. No pooling of secretions. True vocal cords, vestibular folds, ventricles, pyriform sinuses, and aryepiglottic folds appear normal bilaterally. Vocal cords mobile with good contact b/l.\par .\par \par

## 2022-11-25 ENCOUNTER — APPOINTMENT (OUTPATIENT)
Dept: ORTHOPEDIC SURGERY | Facility: CLINIC | Age: 62
End: 2022-11-25

## 2022-11-25 PROCEDURE — 99212 OFFICE O/P EST SF 10 MIN: CPT | Mod: 25

## 2022-11-25 PROCEDURE — 20610 DRAIN/INJ JOINT/BURSA W/O US: CPT | Mod: 50

## 2022-11-25 NOTE — PHYSICAL EXAM
[de-identified] : Right knee Physical Examination:\par \par General: Alert and oriented x3.  In no acute distress.  Pleasant in nature with a normal affect.  No apparent respiratory distress. \par \par Erythema, Warmth, Rubor: Negative\par Swelling/Edema: Negative\par ROM: 0-130 degrees\par Shahriar's Test: Negative \par Medial Joint Line TTP: Positive\par Lateral Joint Line TTP: Negative \par Lachman Exam/Anterior Drawer/Pivot Shift Test: Negative \par MCL Pain: Negative\par LCL Pain: Negative\par Iliotibial Band Pain: Negative\par Patellofemoral Joint Pain: Positive with crepitus noted.\par Patellar Tendon TTP: Negative\par Pes Anserinus TTP: Negative\par Homans Sign: Negative\par Posterior Knee Pain: Negative\par Neuro: Intact with no sensory or motor deficits [de-identified] : No imaging performed today.

## 2022-11-25 NOTE — DISCUSSION/SUMMARY
[de-identified] : Assessment: Bilateral knee Osteoarthritis/Pain\par \par Plan:\par 1. RICE Therapy.\par 2. Antiinflammatories/Tylenol as needed for pain of discomfort. \par 3. The patient was advised to rest the knee for 24-48 hours post injection.  The patient is able to resume normal activities in 24-48 hours post injection if the patient is experiencing minimal to no pain. \par 4. Continue with a home exercise/stretching program. \par 5. All the patients questions were answered.  If the patient is experiencing any problems or has concerns they were advised to call the office or make an appointment to come in to be evaluated.\par \par Follow-up as next week for:\par Right knee Supartz FX #2/3.\par Left knee Orthovisc #2/3. \par

## 2022-11-25 NOTE — HISTORY OF PRESENT ILLNESS
[de-identified] : The patient is a 62-year-old female who presents with right knee pain.  She was diagnosed with bilateral osteoarthritis specifically in the patellofemoral joints and she is feeling pain deep within the knee.  No other complaints.

## 2022-11-25 NOTE — PROCEDURE
[de-identified] : Laterality: Right knee\par \par The risks and benefits of the injection were reviewed with the patient today in office and all their questions were answered.  The injection site was the anterolateral aspect of the knee with the patient sitting up, knees flexed to 90 degrees.  Prior to giving the injection the injection site was prepped with Chloraprep and a sterile field was created.  Sterile technique was carried out throughout the procedure.  After verbal consent from the patient we went ahead and injected the right knee today with Supartz FX #1/3 using a 22 lynn 1.5" needle.  The medication was placed into the knee without complication.  Post injection the patient reported no pain, had a normal gait and good motion of the knee.  The patient denied numbness and tingling going down their leg.  There were no complications during the procedure.\par \par \par Laterality: Left knee\par \par The risks and benefits of the injection were reviewed with the patient today in office and all their questions were answered.  The injection site was the anterolateral aspect of the knee with the patient sitting up, knees flexed to 90 degrees.  Prior to giving the injection the injection site was prepped with Chloraprep and a sterile field was created.  Sterile technique was carried out throughout the procedure.  After verbal consent from the patient we went ahead and injected the left knee today with Orthovisc #1/3 using a 22 lynn 1.5" needle.  The medication was placed into the knee without complication.  Post injection the patient reported no pain, had a normal gait and good motion of the knee.  The patient denied numbness and tingling going down their leg.  There were no complications during the procedure.

## 2022-12-09 ENCOUNTER — APPOINTMENT (OUTPATIENT)
Dept: ORTHOPEDIC SURGERY | Facility: CLINIC | Age: 62
End: 2022-12-09

## 2022-12-09 PROCEDURE — 20610 DRAIN/INJ JOINT/BURSA W/O US: CPT | Mod: 50

## 2022-12-09 PROCEDURE — 99212 OFFICE O/P EST SF 10 MIN: CPT | Mod: 25

## 2022-12-16 ENCOUNTER — APPOINTMENT (OUTPATIENT)
Dept: ORTHOPEDIC SURGERY | Facility: CLINIC | Age: 62
End: 2022-12-16

## 2022-12-16 PROCEDURE — 20610 DRAIN/INJ JOINT/BURSA W/O US: CPT | Mod: 50

## 2022-12-16 NOTE — DISCUSSION/SUMMARY
[de-identified] : Assessment: Bilateral knee Osteoarthritis/Pain\par \par Plan:\par 1. RICE Therapy.\par 2. Antiinflammatories/Tylenol as needed for pain of discomfort. \par 3. The patient was advised to rest the knee for 24-48 hours post injection.  The patient is able to resume normal activities in 24-48 hours post injection if the patient is experiencing minimal to no pain. \par 4. Continue with a home exercise/stretching program. \par 5. All the patients questions were answered.  If the patient is experiencing any problems or has concerns they were advised to call the office or make an appointment to come in to be evaluated.\par \par Follow-up as needed.

## 2022-12-16 NOTE — PROCEDURE
[de-identified] : Laterality: Right knee\par \par The risks and benefits of the injection were reviewed with the patient today in office and all their questions were answered.  The injection site was the anterolateral aspect of the knee with the patient sitting up, knees flexed to 90 degrees.  Prior to giving the injection the injection site was prepped with Chloraprep and a sterile field was created.  Sterile technique was carried out throughout the procedure.  After verbal consent from the patient we went ahead and injected the right knee today with Supartz FX #3/3 using a 22 lynn 1.5" needle.  The medication was placed into the knee without complication.  Post injection the patient reported no pain, had a normal gait and good motion of the knee.  The patient denied numbness and tingling going down their leg.  There were no complications during the procedure.\par \par \par Laterality: Left knee\par \par The risks and benefits of the injection were reviewed with the patient today in office and all their questions were answered.  The injection site was the anterolateral aspect of the knee with the patient sitting up, knees flexed to 90 degrees.  Prior to giving the injection the injection site was prepped with Chloraprep and a sterile field was created.  Sterile technique was carried out throughout the procedure.  After verbal consent from the patient we went ahead and injected the left knee today with Orthovisc #3/3 using a 22 lynn 1.5" needle.  The medication was placed into the knee without complication.  Post injection the patient reported no pain, had a normal gait and good motion of the knee.  The patient denied numbness and tingling going down their leg.  There were no complications during the procedure.

## 2022-12-16 NOTE — HISTORY OF PRESENT ILLNESS
[de-identified] : The patient is a 62-year-old female who presents with right knee pain.  She was diagnosed with bilateral osteoarthritis specifically in the patellofemoral joints and she is feeling pain deep within the knee. She is here to receive b/l hyaluronic gel injections. No other complaints.

## 2022-12-16 NOTE — PHYSICAL EXAM
[de-identified] : Right knee Physical Examination:\par \par General: Alert and oriented x3.  In no acute distress.  Pleasant in nature with a normal affect.  No apparent respiratory distress. \par \par Erythema, Warmth, Rubor: Negative\par Swelling/Edema: Negative\par ROM: 0-130 degrees\par Shahriar's Test: Negative \par Medial Joint Line TTP: Positive\par Lateral Joint Line TTP: Negative \par Lachman Exam/Anterior Drawer/Pivot Shift Test: Negative \par MCL Pain: Negative\par LCL Pain: Negative\par Iliotibial Band Pain: Negative\par Patellofemoral Joint Pain: Positive with crepitus noted.\par Patellar Tendon TTP: Negative\par Pes Anserinus TTP: Negative\par Homans Sign: Negative\par Posterior Knee Pain: Negative\par Neuro: Intact with no sensory or motor deficits [de-identified] : No imaging performed today.

## 2023-03-02 ENCOUNTER — NON-APPOINTMENT (OUTPATIENT)
Age: 63
End: 2023-03-02

## 2023-03-02 DIAGNOSIS — M17.0 BILATERAL PRIMARY OSTEOARTHRITIS OF KNEE: ICD-10-CM

## 2023-03-02 DIAGNOSIS — M23.91 UNSPECIFIED INTERNAL DERANGEMENT OF RIGHT KNEE: ICD-10-CM

## 2023-03-02 DIAGNOSIS — M23.92 UNSPECIFIED INTERNAL DERANGEMENT OF LEFT KNEE: ICD-10-CM

## 2023-03-08 NOTE — PHYSICAL EXAM
[de-identified] : Right knee Physical Examination:\par \par General: Alert and oriented x3.  In no acute distress.  Pleasant in nature with a normal affect.  No apparent respiratory distress. \par \par Erythema, Warmth, Rubor: Negative\par Swelling/Edema: Negative\par ROM: 0-130 degrees\par Shahriar's Test: Positive\par Medial Joint Line TTP: Positive\par Lateral Joint Line TTP: Negative \par Lachman Exam/Anterior Drawer/Pivot Shift Test: Negative \par MCL Pain: Negative\par LCL Pain: Negative\par Iliotibial Band Pain: Negative\par Patellofemoral Joint Pain: Positive with crepitus noted.\par Patellar Tendon TTP: Negative\par Pes Anserinus TTP: Negative\par Homans Sign: Negative\par Posterior Knee Pain: Negative\par Neuro: Intact with no sensory or motor deficits [de-identified] : No imaging performed today.

## 2023-03-08 NOTE — HISTORY OF PRESENT ILLNESS
[de-identified] : The patient is a 62-year-old female who presents with right knee pain.  She was diagnosed with bilateral osteoarthritis specifically in the patellofemoral joints and she is feeling pain deep within the knee. She is here to receive b/l hyaluronic gel injections. No other complaints.

## 2023-03-08 NOTE — PROCEDURE
[de-identified] : Laterality: Right knee\par \par The risks and benefits of the injection were reviewed with the patient today in office and all their questions were answered.  The injection site was the anterolateral aspect of the knee with the patient sitting up, knees flexed to 90 degrees.  Prior to giving the injection the injection site was prepped with Chloraprep and a sterile field was created.  Sterile technique was carried out throughout the procedure.  After verbal consent from the patient we went ahead and injected the right knee today with Supartz FX #2/3 using a 22 lynn 1.5" needle.  The medication was placed into the knee without complication.  Post injection the patient reported no pain, had a normal gait and good motion of the knee.  The patient denied numbness and tingling going down their leg.  There were no complications during the procedure.\par \par \par Laterality: Left knee\par \par The risks and benefits of the injection were reviewed with the patient today in office and all their questions were answered.  The injection site was the anterolateral aspect of the knee with the patient sitting up, knees flexed to 90 degrees.  Prior to giving the injection the injection site was prepped with Chloraprep and a sterile field was created.  Sterile technique was carried out throughout the procedure.  After verbal consent from the patient we went ahead and injected the left knee today with Orthovisc #2/3 using a 22 lynn 1.5" needle.  The medication was placed into the knee without complication.  Post injection the patient reported no pain, had a normal gait and good motion of the knee.  The patient denied numbness and tingling going down their leg.  There were no complications during the procedure.

## 2023-03-08 NOTE — DISCUSSION/SUMMARY
[de-identified] : Assessment: Bilateral knee Osteoarthritis/Pain\par \par Plan:\par 1. RICE Therapy.\par 2. Antiinflammatories/Tylenol as needed for pain of discomfort. \par 3. The patient was advised to rest the knee for 24-48 hours post injection.  The patient is able to resume normal activities in 24-48 hours post injection if the patient is experiencing minimal to no pain. \par 4. Continue with a home exercise/stretching program. \par 5. All the patients questions were answered.  If the patient is experiencing any problems or has concerns they were advised to call the office or make an appointment to come in to be evaluated.\par \par Follow-up as next week for:\par Right knee Supartz FX #3/3.\par Left knee Orthovisc #3/3. \par

## 2023-03-10 ENCOUNTER — RESULT REVIEW (OUTPATIENT)
Age: 63
End: 2023-03-10

## 2023-03-10 ENCOUNTER — OUTPATIENT (OUTPATIENT)
Dept: OUTPATIENT SERVICES | Facility: HOSPITAL | Age: 63
LOS: 1 days | End: 2023-03-10
Payer: MEDICAID

## 2023-03-10 ENCOUNTER — APPOINTMENT (OUTPATIENT)
Dept: MRI IMAGING | Facility: CLINIC | Age: 63
End: 2023-03-10
Payer: MEDICAID

## 2023-03-10 DIAGNOSIS — M23.91 UNSPECIFIED INTERNAL DERANGEMENT OF RIGHT KNEE: ICD-10-CM

## 2023-03-10 DIAGNOSIS — Z00.8 ENCOUNTER FOR OTHER GENERAL EXAMINATION: ICD-10-CM

## 2023-03-10 DIAGNOSIS — M25.561 PAIN IN RIGHT KNEE: ICD-10-CM

## 2023-03-10 DIAGNOSIS — M17.0 BILATERAL PRIMARY OSTEOARTHRITIS OF KNEE: ICD-10-CM

## 2023-03-10 DIAGNOSIS — M23.92 UNSPECIFIED INTERNAL DERANGEMENT OF LEFT KNEE: ICD-10-CM

## 2023-03-10 PROCEDURE — 73721 MRI JNT OF LWR EXTRE W/O DYE: CPT | Mod: 26,RT,77

## 2023-03-10 PROCEDURE — 73721 MRI JNT OF LWR EXTRE W/O DYE: CPT | Mod: 26,LT

## 2023-03-10 PROCEDURE — 73721 MRI JNT OF LWR EXTRE W/O DYE: CPT

## 2023-04-03 ENCOUNTER — APPOINTMENT (OUTPATIENT)
Dept: ORTHOPEDIC SURGERY | Facility: CLINIC | Age: 63
End: 2023-04-03
Payer: MEDICAID

## 2023-04-03 ENCOUNTER — NON-APPOINTMENT (OUTPATIENT)
Age: 63
End: 2023-04-03

## 2023-04-03 VITALS
BODY MASS INDEX: 24.8 KG/M2 | DIASTOLIC BLOOD PRESSURE: 67 MMHG | SYSTOLIC BLOOD PRESSURE: 120 MMHG | HEIGHT: 63 IN | HEART RATE: 85 BPM | WEIGHT: 140 LBS

## 2023-04-03 DIAGNOSIS — M25.562 PAIN IN RIGHT KNEE: ICD-10-CM

## 2023-04-03 DIAGNOSIS — M25.561 PAIN IN RIGHT KNEE: ICD-10-CM

## 2023-04-03 PROCEDURE — 99215 OFFICE O/P EST HI 40 MIN: CPT

## 2023-04-03 PROCEDURE — 73564 X-RAY EXAM KNEE 4 OR MORE: CPT | Mod: 50

## 2023-04-03 NOTE — DISCUSSION/SUMMARY
[de-identified] : Bilateral knee osteoarthritis, meniscus tears\par \par Extensive discussion of the natural history of this issue was had with the patient.  We discussed the treatment options focusing on conservative therapy which includes anti-inflammatories, physical therapy/home exercise, & activity modification.  We discussed that the ultimate treatment for knee osteoarthritis is a knee replacement.  Patient is not quite ready for that.  We also discussed that arthroscopy would not improve the arthritis at all may in fact worsen this.  Based on her exam and history I do not believe meniscal symptoms are the main cause of her complaints.  We did discuss trying steroid or gel injections 1 more time.  Patient would like to think about this.\par -A prescription for Celebrex with the appropriate warnings for GI, cardiac, and renal side effects was given to the patient.  Patient was instructed not to use any other NSAIDs with this medication.\par -Physical therapy prescription was given to the patient.\par Patient will return if the pain returns or does not resolve.

## 2023-04-03 NOTE — PHYSICAL EXAM
[de-identified] : General Appearance: normal without acute distress\par Mental: Alert and oriented x 3\par Psych/affect: appropriate, cooperative\par Gait: Normal gait\par \par Left lower extremity\par Hip: Normal ROM without pain on IR/ER\par \par Knee\par Inspection: no effusion\par Wounds: None\par Alignment: Neutral\par Palpation: tender to palpation at lateral joint line, mildly tender palpation patella tendon\par ROM active (in degrees): 0-140 with crepitus\par Ligamentous laxity: all ligaments appear stable, stable to varus stress test, stable to valgus stress test\par Meniscal Test: Negative McMurrays, negative Mikael.\par Muscle Test: good quad strength.\par \par Right lower extremity\par Hip: Normal ROM without pain on IR/ER\par \par Knee\par Inspection: no effusion\par Wounds: None\par Alignment: Neutral\par Palpation: tender to palpation at medial joint line, positive patella grind test, mildly tender palpation over patella tendon\par ROM active (in degrees): 0-140 with crepitus/pain through the arc of motion\par Ligamentous laxity: all ligaments appear stable, stable to varus stress test, stable to valgus stress test\par Meniscal Test: Negative McMurrays, negative Mikael.\par Muscle Test: good quad strength. [de-identified] : 4/3/2023–bilateral knee xrays, standing AP/Lateral and Merchant films, and 45 degree PA standing view are reviewed and demonstrate diffuse tricompartmental degenerative arthritis, [medial] joint space narrowing, marginal osteophytes, bone on bone with sclerosis, patellofemoral joint space narrowing\par \par 3/14/2023–left knee MRI medial meniscus tear, evidence of prior MCL injury, cartilage loss full-thickness and patellofemoral compartment, medial compartment and fissuring of lateral compartment\par Right knee MRI–severe tricompartment arthrosis, medial meniscus tear, Baker's cyst

## 2023-04-03 NOTE — HISTORY OF PRESENT ILLNESS
[de-identified] : 4/3/2023–patient presents with bilateral knee pain, left worse than right.  This been going on for over a year.  Describes pain is on the medial aspect of her knee.  Denies radiating pain numbness tingling, or buckling symptoms.  States stairs aggravate her pain.  Tried meloxicam previously which did help although the is not taking that anymore is taking Advil at this time.  States she takes this nearly every day.  Did have steroid injections back in September which helped for a month or 2 then had gel injections in December which only helped for a month or 2.  Denies allergies, anticoagulation, history of DVTs, past medical history significant for osteoporosis being treated on Fosamax

## 2023-11-24 RX ORDER — CELECOXIB 200 MG/1
200 CAPSULE ORAL
Qty: 60 | Refills: 1 | Status: ACTIVE | COMMUNITY
Start: 2023-04-03 | End: 1900-01-01

## 2024-02-14 NOTE — H&P PST ADULT - MAMMOGRAM, LAST, PROFILE
irritation and erythema/superficial lacerations R neck
2018
No chest wall deformities/Normal respiratory pattern/Symmetric breath sounds clear to auscultation and percussion

## 2024-03-13 RX ORDER — CELECOXIB 200 MG/1
200 CAPSULE ORAL TWICE DAILY
Qty: 30 | Refills: 2 | Status: ACTIVE | COMMUNITY
Start: 2024-03-13 | End: 1900-01-01

## 2024-03-21 DIAGNOSIS — M17.0 BILATERAL PRIMARY OSTEOARTHRITIS OF KNEE: ICD-10-CM

## 2024-04-26 ENCOUNTER — APPOINTMENT (OUTPATIENT)
Dept: RHEUMATOLOGY | Facility: CLINIC | Age: 64
End: 2024-04-26
Payer: MEDICAID

## 2024-04-26 VITALS
DIASTOLIC BLOOD PRESSURE: 78 MMHG | OXYGEN SATURATION: 98 % | BODY MASS INDEX: 25.69 KG/M2 | HEART RATE: 70 BPM | TEMPERATURE: 97.2 F | WEIGHT: 145 LBS | SYSTOLIC BLOOD PRESSURE: 124 MMHG | HEIGHT: 63 IN

## 2024-04-26 DIAGNOSIS — E78.5 HYPERLIPIDEMIA, UNSPECIFIED: ICD-10-CM

## 2024-04-26 DIAGNOSIS — M19.90 UNSPECIFIED OSTEOARTHRITIS, UNSPECIFIED SITE: ICD-10-CM

## 2024-04-26 DIAGNOSIS — Z87.39 PERSONAL HISTORY OF OTHER DISEASES OF THE MUSCULOSKELETAL SYSTEM AND CONNECTIVE TISSUE: ICD-10-CM

## 2024-04-26 DIAGNOSIS — Z86.39 PERSONAL HISTORY OF OTHER ENDOCRINE, NUTRITIONAL AND METABOLIC DISEASE: ICD-10-CM

## 2024-04-26 PROCEDURE — G2211 COMPLEX E/M VISIT ADD ON: CPT | Mod: NC,1L

## 2024-04-26 PROCEDURE — 99204 OFFICE O/P NEW MOD 45 MIN: CPT

## 2024-04-26 NOTE — PHYSICAL EXAM
[General Appearance - In No Acute Distress] : in no acute distress [General Appearance - Alert] : alert [General Appearance - Well Developed] : well developed [General Appearance - Well-Appearing] : healthy appearing [Sclera] : the sclera and conjunctiva were normal [Extraocular Movements] : extraocular movements were intact [Outer Ear] : the ears and nose were normal in appearance [Neck Appearance] : the appearance of the neck was normal [] : no respiratory distress [Exaggerated Use Of Accessory Muscles For Inspiration] : no accessory muscle use [Edema] : there was no peripheral edema [No Focal Deficits] : no focal deficits [Oriented To Time, Place, And Person] : oriented to person, place, and time [Affect] : the affect was normal [Mood] : the mood was normal

## 2024-04-26 NOTE — HISTORY OF PRESENT ILLNESS
[FreeTextEntry1] : 63F here for osteoporosis evaluation. She also has b/l knee osteoarthritis (sees orthopedics, attending PT also due to hx of meniscal tears reported). Osteoporosis was previously being managed by her PCP- she states she has been on alendronate for 4-5 years after initial diagnosis, with most recent DEXA done in 7/2023 showing T score worst of -2.9. Prior to that she did have a DEXA in 2022- with no clear summary of T scores- but she was told by PCP that osteoporosis was not improving despite alendronate- was referred to rheumatology for discussion of alternate medication options.   Patient reached menopause at age 45. No alcohol or hx of tobacco use. No hx of chronic steroid, PPI or antiepileptic use. No reported parental history of hip fractures. No reported weight loss; rather weight gain recently. She takes calcium and vitamin D supplements occasionally but not daily. Patient's daughter in law is with her today and states patient fasts a few days per week and doesnt eat well on those days.   She denies history of fractures apart from remote hx of toe fractures from bumping her toe.  [Weight Loss] : no weight loss [Difficulty Standing] : no difficulty standing [Difficulty Walking] : no difficulty walking

## 2024-04-26 NOTE — REVIEW OF SYSTEMS
[Recent Weight Gain (___ Lbs)] : recent [unfilled] ~Ulb weight gain [Recent Weight Loss (___ Lbs)] : no recent weight loss [As Noted in HPI] : as noted in HPI [Negative] : Heme/Lymph

## 2024-04-26 NOTE — ASSESSMENT
[FreeTextEntry1] : 63F here for osteoporosis evaluation. She also has b/l knee osteoarthritis (sees orthopedics, attending PT also due to hx of meniscal tears reported). Osteoporosis was previously being managed by her PCP- she states she has been on alendronate for 4-5 years after initial diagnosis, with most recent DEXA done in 7/2023 showing T score worst of -2.9. Prior to that she did have a DEXA in 2022- with no clear summary of T scores- but she was told by PCP that osteoporosis was not improving despite alendronate- was referred to rheumatology for discussion of alternate medication options.   Prolia vs. Evenity were discussed as alternate options. She was advised not to continue bisphosphonate at this time as she has taken it for about 5 years with no reported significant improvement- after 5 years she needs a drug holiday anyway due to risk of atypical femur fractures with prolonged use of bisphosphonate;  Patient elected to start Prolia [id number] subQ injections- will check CMP, Vitamin D, TSH, PTH and order Prolia- awaiting authorization.  Patient also advised to take daily calcium (1200 mg daily- half from food) and vitamin D 1000U , and to do weight bearing exercises as tolerated.

## 2024-05-07 ENCOUNTER — NON-APPOINTMENT (OUTPATIENT)
Age: 64
End: 2024-05-07

## 2024-05-07 LAB
25(OH)D3 SERPL-MCNC: 31.3 NG/ML
CALCIUM SERPL-MCNC: 9.1 MG/DL
PARATHYROID HORMONE INTACT: 52 PG/ML
TSH SERPL-ACNC: 4.66 UIU/ML

## 2024-05-08 LAB
ALBUMIN SERPL ELPH-MCNC: 4.1 G/DL
ALP BLD-CCNC: 66 U/L
ALT SERPL-CCNC: 15 U/L
ANION GAP SERPL CALC-SCNC: 9 MMOL/L
AST SERPL-CCNC: 17 U/L
BILIRUB SERPL-MCNC: 0.3 MG/DL
BUN SERPL-MCNC: 12 MG/DL
CALCIUM SERPL-MCNC: 9.1 MG/DL
CHLORIDE SERPL-SCNC: 104 MMOL/L
CO2 SERPL-SCNC: 24 MMOL/L
CREAT SERPL-MCNC: 0.72 MG/DL
EGFR: 94 ML/MIN/1.73M2
GLUCOSE SERPL-MCNC: 98 MG/DL
POTASSIUM SERPL-SCNC: 4.4 MMOL/L
PROT SERPL-MCNC: 6.8 G/DL
SODIUM SERPL-SCNC: 137 MMOL/L

## 2024-05-10 ENCOUNTER — APPOINTMENT (OUTPATIENT)
Dept: RHEUMATOLOGY | Facility: CLINIC | Age: 64
End: 2024-05-10
Payer: MEDICAID

## 2024-05-10 VITALS
DIASTOLIC BLOOD PRESSURE: 76 MMHG | HEIGHT: 63 IN | OXYGEN SATURATION: 97 % | TEMPERATURE: 97.3 F | SYSTOLIC BLOOD PRESSURE: 140 MMHG | WEIGHT: 145 LBS | HEART RATE: 75 BPM | BODY MASS INDEX: 25.69 KG/M2

## 2024-05-10 DIAGNOSIS — M81.0 AGE-RELATED OSTEOPOROSIS W/OUT CURRENT PATHOLOGICAL FRACTURE: ICD-10-CM

## 2024-05-10 PROCEDURE — 96372 THER/PROPH/DIAG INJ SC/IM: CPT

## 2024-05-10 RX ORDER — DENOSUMAB 60 MG/ML
60 INJECTION SUBCUTANEOUS
Qty: 1 | Refills: 0 | Status: COMPLETED | OUTPATIENT
Start: 2024-05-10

## 2024-05-10 RX ADMIN — DENOSUMAB 0 MG/ML: 60 INJECTION SUBCUTANEOUS at 00:00

## 2024-05-10 NOTE — ASSESSMENT
[FreeTextEntry1] : First dose Prolia injection was given to Left upper extremity today with no complications. She was advised to follow up in 6 months for next Prolia shot. Advised to get labs (CMP, Vitamin D) done before that.  Advised to call our office back if experiencing side effects for more than a couple of days afterward such as headaches, body aches, chills, etc.

## 2024-05-31 RX ORDER — DENOSUMAB 60 MG/ML
60 INJECTION SUBCUTANEOUS
Qty: 1 | Refills: 1 | Status: ACTIVE | COMMUNITY
Start: 2024-04-26

## 2024-06-14 ENCOUNTER — APPOINTMENT (OUTPATIENT)
Dept: CARDIOLOGY | Facility: CLINIC | Age: 64
End: 2024-06-14
Payer: MEDICAID

## 2024-06-14 ENCOUNTER — NON-APPOINTMENT (OUTPATIENT)
Age: 64
End: 2024-06-14

## 2024-06-14 VITALS
BODY MASS INDEX: 25.52 KG/M2 | SYSTOLIC BLOOD PRESSURE: 115 MMHG | HEIGHT: 63 IN | HEART RATE: 88 BPM | DIASTOLIC BLOOD PRESSURE: 76 MMHG | OXYGEN SATURATION: 95 % | WEIGHT: 144 LBS

## 2024-06-14 DIAGNOSIS — R00.2 PALPITATIONS: ICD-10-CM

## 2024-06-14 DIAGNOSIS — E78.2 MIXED HYPERLIPIDEMIA: ICD-10-CM

## 2024-06-14 PROCEDURE — G2211 COMPLEX E/M VISIT ADD ON: CPT | Mod: NC

## 2024-06-14 PROCEDURE — 93000 ELECTROCARDIOGRAM COMPLETE: CPT

## 2024-06-14 PROCEDURE — 99204 OFFICE O/P NEW MOD 45 MIN: CPT | Mod: 25

## 2024-06-17 NOTE — HISTORY OF PRESENT ILLNESS
[FreeTextEntry1] : 63 year old with a history HLD, knee arthritis.  presents for an initial cardiac evaluation.   She recently lost her  a few weeks ago after a recent sickness. She has been complaining of random episodes of chest tightness, dyspnea, palpitations when she thinks about her .  She   denies any chest pain, PND, orthopnea, lower extremity edema, near syncope, syncope, strokelike symptoms.

## 2024-06-17 NOTE — DISCUSSION/SUMMARY
[FreeTextEntry1] : 63 year woman with a history as listed presents for an initial cardiac evaluation.  Sanjay has suffered a tragedy recently and is currently grieving. She has palpitations that likely related to a panic attack. I would monitor her symptoms for now and encouraged her to take the PRN Xanax.  She will get a 2d echo to assess for any  new structural heart disease, changes in valvular and ventricular function.  Her EKG did not reveal any significant ischemic changes. Defer any ischemic testing for now.  She will continue with statin therapy to achieve maintain goal LDL<100.  Exercise and diet counseling was performed in order to reduce her future cardiovascular risk.  She will followup with me in 6 months or sooner if necessary.  [EKG obtained to assist in diagnosis and management of assessed problem(s)] : EKG obtained to assist in diagnosis and management of assessed problem(s)

## 2024-07-24 ENCOUNTER — APPOINTMENT (OUTPATIENT)
Dept: RADIOLOGY | Facility: CLINIC | Age: 64
End: 2024-07-24
Payer: MEDICAID

## 2024-07-24 ENCOUNTER — APPOINTMENT (OUTPATIENT)
Dept: ULTRASOUND IMAGING | Facility: CLINIC | Age: 64
End: 2024-07-24
Payer: MEDICAID

## 2024-07-24 PROCEDURE — 72100 X-RAY EXAM L-S SPINE 2/3 VWS: CPT | Mod: 26

## 2024-07-24 PROCEDURE — 73521 X-RAY EXAM HIPS BI 2 VIEWS: CPT | Mod: 26

## 2024-07-24 PROCEDURE — 76700 US EXAM ABDOM COMPLETE: CPT | Mod: 26

## 2024-07-26 ENCOUNTER — APPOINTMENT (OUTPATIENT)
Dept: CARDIOLOGY | Facility: CLINIC | Age: 64
End: 2024-07-26
Payer: MEDICAID

## 2024-07-26 ENCOUNTER — MED ADMIN CHARGE (OUTPATIENT)
Age: 64
End: 2024-07-26

## 2024-07-26 PROCEDURE — 93306 TTE W/DOPPLER COMPLETE: CPT

## 2024-07-26 RX ORDER — PERFLUTREN 6.52 MG/ML
6.52 INJECTION, SUSPENSION INTRAVENOUS
Qty: 1 | Refills: 0 | Status: COMPLETED | OUTPATIENT
Start: 2024-07-26

## 2024-07-26 RX ADMIN — PERFLUTREN MG/ML: 6.52 INJECTION, SUSPENSION INTRAVENOUS at 00:00

## 2024-09-27 ENCOUNTER — APPOINTMENT (OUTPATIENT)
Dept: CARDIOLOGY | Facility: CLINIC | Age: 64
End: 2024-09-27
Payer: MEDICAID

## 2024-09-27 VITALS
OXYGEN SATURATION: 96 % | DIASTOLIC BLOOD PRESSURE: 74 MMHG | SYSTOLIC BLOOD PRESSURE: 114 MMHG | HEART RATE: 75 BPM | WEIGHT: 146 LBS | HEIGHT: 63 IN | BODY MASS INDEX: 25.87 KG/M2

## 2024-09-27 DIAGNOSIS — E78.2 MIXED HYPERLIPIDEMIA: ICD-10-CM

## 2024-09-27 DIAGNOSIS — R06.09 OTHER FORMS OF DYSPNEA: ICD-10-CM

## 2024-09-27 PROCEDURE — 99214 OFFICE O/P EST MOD 30 MIN: CPT | Mod: 25

## 2024-09-27 PROCEDURE — 93000 ELECTROCARDIOGRAM COMPLETE: CPT

## 2024-09-27 NOTE — DISCUSSION/SUMMARY
[FreeTextEntry1] : 63 year woman with a history as listed presents for an initial cardiac evaluation.  Sanjay is doing well. She denies any anginal symptoms. Clinically she is euvolemic on exam. Her EKG did not reveal any significant ischemic changes. SHe has mild SEVILLA. She will undergo a treadmill exercise stress test to define exercise tolerance, rule out exertional hypertensive responses, assess for exercise induced arrhythmias and rule out ischemia from obstructive CAD.  Her EKG did not reveal any significant ischemic changes. Defer any ischemic testing for now.  She will continue with statin therapy to achieve maintain goal LDL<100.  Exercise and diet counseling was performed in order to reduce her future cardiovascular risk.  She will followup with me in 6 months or sooner if necessary.  [EKG obtained to assist in diagnosis and management of assessed problem(s)] : EKG obtained to assist in diagnosis and management of assessed problem(s)

## 2024-09-27 NOTE — HISTORY OF PRESENT ILLNESS
[FreeTextEntry1] : 64year old with a history HLD, knee arthritis.  presents for a follow up cardiac evaluation.   Since her last visit, she has been feeling well. She complains of mild SEVILLA on long flights of stairs. She   denies any chest pain, PND, orthopnea, lower extremity edema, near syncope, syncope, strokelike symptoms.

## 2024-11-05 DIAGNOSIS — M81.0 AGE-RELATED OSTEOPOROSIS W/OUT CURRENT PATHOLOGICAL FRACTURE: ICD-10-CM

## 2024-11-14 LAB
25(OH)D3 SERPL-MCNC: 36.4 NG/ML
ALBUMIN SERPL ELPH-MCNC: 4.1 G/DL
ALP BLD-CCNC: 62 U/L
ALT SERPL-CCNC: 17 U/L
ANION GAP SERPL CALC-SCNC: 11 MMOL/L
AST SERPL-CCNC: 17 U/L
BILIRUB SERPL-MCNC: 0.3 MG/DL
BUN SERPL-MCNC: 11 MG/DL
CALCIUM SERPL-MCNC: 9.3 MG/DL
CHLORIDE SERPL-SCNC: 104 MMOL/L
CO2 SERPL-SCNC: 24 MMOL/L
CREAT SERPL-MCNC: 0.66 MG/DL
EGFR: 98 ML/MIN/1.73M2
GLUCOSE SERPL-MCNC: 100 MG/DL
POTASSIUM SERPL-SCNC: 4.5 MMOL/L
PROT SERPL-MCNC: 7 G/DL
SODIUM SERPL-SCNC: 139 MMOL/L
TSH SERPL-ACNC: 6.16 UIU/ML

## 2024-11-19 ENCOUNTER — APPOINTMENT (OUTPATIENT)
Dept: CARDIOLOGY | Facility: CLINIC | Age: 64
End: 2024-11-19
Payer: MEDICAID

## 2024-11-19 ENCOUNTER — APPOINTMENT (OUTPATIENT)
Dept: RHEUMATOLOGY | Facility: CLINIC | Age: 64
End: 2024-11-19
Payer: MEDICAID

## 2024-11-19 VITALS
WEIGHT: 145 LBS | SYSTOLIC BLOOD PRESSURE: 122 MMHG | BODY MASS INDEX: 25.69 KG/M2 | OXYGEN SATURATION: 97 % | HEIGHT: 63 IN | DIASTOLIC BLOOD PRESSURE: 68 MMHG | HEART RATE: 82 BPM | TEMPERATURE: 97.6 F

## 2024-11-19 DIAGNOSIS — M81.0 AGE-RELATED OSTEOPOROSIS W/OUT CURRENT PATHOLOGICAL FRACTURE: ICD-10-CM

## 2024-11-19 PROCEDURE — 99214 OFFICE O/P EST MOD 30 MIN: CPT | Mod: 25

## 2024-11-19 PROCEDURE — 93015 CV STRESS TEST SUPVJ I&R: CPT

## 2024-11-19 PROCEDURE — 96372 THER/PROPH/DIAG INJ SC/IM: CPT

## 2024-11-19 RX ORDER — DENOSUMAB 60 MG/ML
60 INJECTION SUBCUTANEOUS
Qty: 1 | Refills: 0 | Status: COMPLETED | OUTPATIENT
Start: 2024-11-19

## 2024-11-19 RX ADMIN — DENOSUMAB 0 MG/ML: 60 INJECTION SUBCUTANEOUS at 00:00

## 2025-02-27 RX ORDER — CELECOXIB 200 MG/1
200 CAPSULE ORAL TWICE DAILY
Qty: 60 | Refills: 1 | Status: ACTIVE | COMMUNITY
Start: 2025-02-27 | End: 1900-01-01

## 2025-04-02 ENCOUNTER — NON-APPOINTMENT (OUTPATIENT)
Age: 65
End: 2025-04-02

## 2025-04-03 ENCOUNTER — NON-APPOINTMENT (OUTPATIENT)
Age: 65
End: 2025-04-03

## 2025-04-03 ENCOUNTER — APPOINTMENT (OUTPATIENT)
Dept: CARDIOLOGY | Facility: CLINIC | Age: 65
End: 2025-04-03
Payer: MEDICAID

## 2025-04-03 VITALS
DIASTOLIC BLOOD PRESSURE: 79 MMHG | HEART RATE: 70 BPM | HEIGHT: 63 IN | SYSTOLIC BLOOD PRESSURE: 134 MMHG | OXYGEN SATURATION: 98 % | BODY MASS INDEX: 26.22 KG/M2 | WEIGHT: 148 LBS

## 2025-04-03 DIAGNOSIS — E78.2 MIXED HYPERLIPIDEMIA: ICD-10-CM

## 2025-04-03 PROCEDURE — 93000 ELECTROCARDIOGRAM COMPLETE: CPT

## 2025-04-03 PROCEDURE — 99214 OFFICE O/P EST MOD 30 MIN: CPT | Mod: 25

## 2025-04-17 DIAGNOSIS — M81.0 AGE-RELATED OSTEOPOROSIS W/OUT CURRENT PATHOLOGICAL FRACTURE: ICD-10-CM

## 2025-04-22 ENCOUNTER — APPOINTMENT (OUTPATIENT)
Dept: CT IMAGING | Facility: CLINIC | Age: 65
End: 2025-04-22
Payer: SELF-PAY

## 2025-04-22 PROCEDURE — 75571 CT HRT W/O DYE W/CA TEST: CPT

## 2025-04-29 ENCOUNTER — RX RENEWAL (OUTPATIENT)
Age: 65
End: 2025-04-29

## 2025-05-22 ENCOUNTER — APPOINTMENT (OUTPATIENT)
Dept: RHEUMATOLOGY | Facility: CLINIC | Age: 65
End: 2025-05-22
Payer: MEDICAID

## 2025-05-22 VITALS
DIASTOLIC BLOOD PRESSURE: 78 MMHG | OXYGEN SATURATION: 99 % | HEIGHT: 63 IN | BODY MASS INDEX: 25.87 KG/M2 | HEART RATE: 71 BPM | WEIGHT: 146 LBS | TEMPERATURE: 97.5 F | SYSTOLIC BLOOD PRESSURE: 140 MMHG

## 2025-05-22 DIAGNOSIS — M81.0 AGE-RELATED OSTEOPOROSIS W/OUT CURRENT PATHOLOGICAL FRACTURE: ICD-10-CM

## 2025-05-22 PROCEDURE — 99214 OFFICE O/P EST MOD 30 MIN: CPT | Mod: 25

## 2025-05-22 PROCEDURE — 96372 THER/PROPH/DIAG INJ SC/IM: CPT

## 2025-05-22 RX ORDER — DENOSUMAB 60 MG/ML
60 INJECTION SUBCUTANEOUS
Qty: 1 | Refills: 0 | Status: COMPLETED | OUTPATIENT
Start: 2025-05-22

## 2025-05-22 RX ADMIN — DENOSUMAB 0 MG/ML: 60 INJECTION SUBCUTANEOUS at 00:00
